# Patient Record
Sex: FEMALE
[De-identification: names, ages, dates, MRNs, and addresses within clinical notes are randomized per-mention and may not be internally consistent; named-entity substitution may affect disease eponyms.]

---

## 2023-05-01 ENCOUNTER — NURSE TRIAGE (OUTPATIENT)
Dept: OTHER | Facility: CLINIC | Age: 38
End: 2023-05-01

## 2023-05-01 NOTE — TELEPHONE ENCOUNTER
Pt requests information about in-patient intensive psych facilities. She would like to be admitted without going through an ED and wants to be discharged so that she can have intensive psych care as an outpatient. Pt's friend was explaining how psych admissions work to pt. Pt's friend states he is going to take her to an ED now. Triage RN encouraged pt to go with her friend.